# Patient Record
Sex: MALE | Race: AMERICAN INDIAN OR ALASKA NATIVE
[De-identification: names, ages, dates, MRNs, and addresses within clinical notes are randomized per-mention and may not be internally consistent; named-entity substitution may affect disease eponyms.]

---

## 2022-10-11 ENCOUNTER — HOSPITAL ENCOUNTER (INPATIENT)
Dept: HOSPITAL 95 - ER | Age: 62
LOS: 3 days | Discharge: HOME | DRG: 871 | End: 2022-10-14
Attending: STUDENT IN AN ORGANIZED HEALTH CARE EDUCATION/TRAINING PROGRAM | Admitting: HOSPITALIST
Payer: COMMERCIAL

## 2022-10-11 VITALS — BODY MASS INDEX: 14.79 KG/M2 | WEIGHT: 86.64 LBS | HEIGHT: 64 IN

## 2022-10-11 DIAGNOSIS — Z98.890: ICD-10-CM

## 2022-10-11 DIAGNOSIS — Z85.47: ICD-10-CM

## 2022-10-11 DIAGNOSIS — Z98.52: ICD-10-CM

## 2022-10-11 DIAGNOSIS — J96.21: ICD-10-CM

## 2022-10-11 DIAGNOSIS — E87.20: ICD-10-CM

## 2022-10-11 DIAGNOSIS — A41.9: Primary | ICD-10-CM

## 2022-10-11 DIAGNOSIS — E43: ICD-10-CM

## 2022-10-11 DIAGNOSIS — R65.20: ICD-10-CM

## 2022-10-11 DIAGNOSIS — J44.1: ICD-10-CM

## 2022-10-11 DIAGNOSIS — R64: ICD-10-CM

## 2022-10-11 DIAGNOSIS — Z91.018: ICD-10-CM

## 2022-10-11 DIAGNOSIS — F17.210: ICD-10-CM

## 2022-10-11 DIAGNOSIS — R73.03: ICD-10-CM

## 2022-10-11 DIAGNOSIS — Z79.51: ICD-10-CM

## 2022-10-11 LAB
ALBUMIN SERPL BCP-MCNC: 3.2 G/DL (ref 3.4–5)
ALBUMIN/GLOB SERPL: 0.7 {RATIO} (ref 0.8–1.8)
ALT SERPL W P-5'-P-CCNC: 36 U/L (ref 12–78)
ANION GAP SERPL CALCULATED.4IONS-SCNC: 4 MMOL/L (ref 6–16)
AST SERPL W P-5'-P-CCNC: 22 U/L (ref 12–37)
BASOPHILS # BLD AUTO: 0.06 K/MM3 (ref 0–0.23)
BASOPHILS NFR BLD AUTO: 1 % (ref 0–2)
BILIRUB SERPL-MCNC: 0.2 MG/DL (ref 0.1–1)
BUN SERPL-MCNC: 17 MG/DL (ref 8–24)
CALCIUM SERPL-MCNC: 9.5 MG/DL (ref 8.5–10.1)
CHLORIDE SERPL-SCNC: 103 MMOL/L (ref 98–108)
CO2 SERPL-SCNC: 32 MMOL/L (ref 21–32)
CREAT SERPL-MCNC: 0.73 MG/DL (ref 0.6–1.2)
DEPRECATED RDW RBC AUTO: 42.7 FL (ref 35.1–46.3)
EOSINOPHIL # BLD AUTO: 0.09 K/MM3 (ref 0–0.68)
EOSINOPHIL NFR BLD AUTO: 1 % (ref 0–6)
ERYTHROCYTE [DISTWIDTH] IN BLOOD BY AUTOMATED COUNT: 11.9 % (ref 11.7–14.2)
GLOBULIN SER CALC-MCNC: 4.7 G/DL (ref 2.2–4)
GLUCOSE SERPL-MCNC: 126 MG/DL (ref 70–99)
HCT VFR BLD AUTO: 47.6 % (ref 37–53)
HGB BLD-MCNC: 15.5 G/DL (ref 13.5–17.5)
IMM GRANULOCYTES # BLD AUTO: 0.2 K/MM3 (ref 0–0.1)
IMM GRANULOCYTES NFR BLD AUTO: 2 % (ref 0–1)
LYMPHOCYTES # BLD AUTO: 1.32 K/MM3 (ref 0.84–5.2)
LYMPHOCYTES NFR BLD AUTO: 11 % (ref 21–46)
MCHC RBC AUTO-ENTMCNC: 32.6 G/DL (ref 31.5–36.5)
MCV RBC AUTO: 97 FL (ref 80–100)
MONOCYTES # BLD AUTO: 1.29 K/MM3 (ref 0.16–1.47)
MONOCYTES NFR BLD AUTO: 10 % (ref 4–13)
NEUTROPHILS # BLD AUTO: 9.48 K/MM3 (ref 1.96–9.15)
NEUTROPHILS NFR BLD AUTO: 76 % (ref 41–73)
NRBC # BLD AUTO: 0 K/MM3 (ref 0–0.02)
NRBC BLD AUTO-RTO: 0 /100 WBC (ref 0–0.2)
PLATELET # BLD AUTO: 445 K/MM3 (ref 150–400)
POTASSIUM SERPL-SCNC: 4.4 MMOL/L (ref 3.5–5.5)
PROT SERPL-MCNC: 7.9 G/DL (ref 6.4–8.2)
SODIUM SERPL-SCNC: 139 MMOL/L (ref 136–145)

## 2022-10-11 PROCEDURE — A9270 NON-COVERED ITEM OR SERVICE: HCPCS

## 2022-10-12 LAB
ANION GAP SERPL CALCULATED.4IONS-SCNC: 4 MMOL/L (ref 6–16)
BASOPHILS # BLD AUTO: 0.04 K/MM3 (ref 0–0.23)
BASOPHILS NFR BLD AUTO: 0 % (ref 0–2)
BUN SERPL-MCNC: 22 MG/DL (ref 8–24)
CALCIUM SERPL-MCNC: 9.2 MG/DL (ref 8.5–10.1)
CHLORIDE SERPL-SCNC: 103 MMOL/L (ref 98–108)
CO2 SERPL-SCNC: 35 MMOL/L (ref 21–32)
CREAT SERPL-MCNC: 0.78 MG/DL (ref 0.6–1.2)
DEPRECATED RDW RBC AUTO: 44.2 FL (ref 35.1–46.3)
EOSINOPHIL # BLD AUTO: 0 K/MM3 (ref 0–0.68)
EOSINOPHIL NFR BLD AUTO: 0 % (ref 0–6)
ERYTHROCYTE [DISTWIDTH] IN BLOOD BY AUTOMATED COUNT: 11.9 % (ref 11.7–14.2)
GLUCOSE SERPL-MCNC: 176 MG/DL (ref 70–99)
HCT VFR BLD AUTO: 48 % (ref 37–53)
HGB BLD-MCNC: 15.1 G/DL (ref 13.5–17.5)
IMM GRANULOCYTES # BLD AUTO: 0.18 K/MM3 (ref 0–0.1)
IMM GRANULOCYTES NFR BLD AUTO: 2 % (ref 0–1)
LYMPHOCYTES # BLD AUTO: 0.51 K/MM3 (ref 0.84–5.2)
LYMPHOCYTES NFR BLD AUTO: 5 % (ref 21–46)
MCHC RBC AUTO-ENTMCNC: 31.5 G/DL (ref 31.5–36.5)
MCV RBC AUTO: 100 FL (ref 80–100)
MONOCYTES # BLD AUTO: 0.18 K/MM3 (ref 0.16–1.47)
MONOCYTES NFR BLD AUTO: 2 % (ref 4–13)
NEUTROPHILS # BLD AUTO: 8.61 K/MM3 (ref 1.96–9.15)
NEUTROPHILS NFR BLD AUTO: 90 % (ref 41–73)
NRBC # BLD AUTO: 0 K/MM3 (ref 0–0.02)
NRBC BLD AUTO-RTO: 0 /100 WBC (ref 0–0.2)
PLATELET # BLD AUTO: 372 K/MM3 (ref 150–400)
POTASSIUM SERPL-SCNC: 5.8 MMOL/L (ref 3.5–5.5)
SODIUM SERPL-SCNC: 142 MMOL/L (ref 136–145)

## 2022-10-12 NOTE — NUR
SUMMARY
 
PT SITTING UP AT THE EDGE OF THE BED EATING DINNER, PT HAS BEEN PLEASANT AND
COOPERATIVE T/O THE DAY, USES THE CALL LIGHT APPROPRIATELY, INDEPENDENT IN THE
ROOM, PT MED PER EMAR FOR C/O NAUSEA DURING ANTIBIOTIC ADMINISTRATION, WITH
GOOD RESULTS, VSS, WILL CONT TO MONITOR

## 2022-10-13 LAB
ANION GAP SERPL CALCULATED.4IONS-SCNC: 3 MMOL/L (ref 6–16)
BASOPHILS # BLD AUTO: 0.06 K/MM3 (ref 0–0.23)
BASOPHILS NFR BLD AUTO: 0 % (ref 0–2)
BUN SERPL-MCNC: 20 MG/DL (ref 8–24)
CALCIUM SERPL-MCNC: 9.3 MG/DL (ref 8.5–10.1)
CHLORIDE SERPL-SCNC: 99 MMOL/L (ref 98–108)
CO2 SERPL-SCNC: 38 MMOL/L (ref 21–32)
CREAT SERPL-MCNC: 0.62 MG/DL (ref 0.6–1.2)
DEPRECATED RDW RBC AUTO: 43.8 FL (ref 35.1–46.3)
EOSINOPHIL # BLD AUTO: 0 K/MM3 (ref 0–0.68)
EOSINOPHIL NFR BLD AUTO: 0 % (ref 0–6)
ERYTHROCYTE [DISTWIDTH] IN BLOOD BY AUTOMATED COUNT: 11.9 % (ref 11.7–14.2)
GLUCOSE SERPL-MCNC: 144 MG/DL (ref 70–99)
HCT VFR BLD AUTO: 44.6 % (ref 37–53)
HGB BLD-MCNC: 14.5 G/DL (ref 13.5–17.5)
IMM GRANULOCYTES # BLD AUTO: 0.38 K/MM3 (ref 0–0.1)
IMM GRANULOCYTES NFR BLD AUTO: 2 % (ref 0–1)
LYMPHOCYTES # BLD AUTO: 0.6 K/MM3 (ref 0.84–5.2)
LYMPHOCYTES NFR BLD AUTO: 3 % (ref 21–46)
MAGNESIUM SERPL-MCNC: 2.3 MG/DL (ref 1.6–2.4)
MCHC RBC AUTO-ENTMCNC: 32.5 G/DL (ref 31.5–36.5)
MCV RBC AUTO: 99 FL (ref 80–100)
MONOCYTES # BLD AUTO: 0.76 K/MM3 (ref 0.16–1.47)
MONOCYTES NFR BLD AUTO: 4 % (ref 4–13)
NEUTROPHILS # BLD AUTO: 15.6 K/MM3 (ref 1.96–9.15)
NEUTROPHILS NFR BLD AUTO: 90 % (ref 41–73)
NRBC # BLD AUTO: 0 K/MM3 (ref 0–0.02)
NRBC BLD AUTO-RTO: 0 /100 WBC (ref 0–0.2)
PLATELET # BLD AUTO: 371 K/MM3 (ref 150–400)
POTASSIUM SERPL-SCNC: 5 MMOL/L (ref 3.5–5.5)
SODIUM SERPL-SCNC: 140 MMOL/L (ref 136–145)

## 2022-10-13 NOTE — NUR
SUMMARY
 
PT SITTING UP AT THE BEDSIDE EATING DINNER, PT HAS BEEN PLEASANT AND
COOPERATIVE T/O THE DAY, INDEP IN THE ROOM, REMAINS ON 2L NC, HOME O2 EVAL
DONE, CARE MANAGEMENT TO ARRANGE HOME OXYGEN, PT DENIES PAIN OR NAUSEA,
HOPEFUL TO DC HOME TOMORROW, VSS, WILL CONT TO MONITOR

## 2022-10-13 NOTE — NUR
NIGHT SHIFT SUMMARY:
A&Ox4. PLEASANT AND COOPERATIVE WITH CARE. VSS. INDEPENDENT WITHIN ROOM. NO
C/O PAIN OR DISCOMFORT T/O THE NIGHT. NO ACUTE CONCERNS. WILL REPORT TO
ONCOMING RN.

## 2022-10-14 LAB
ANION GAP SERPL CALCULATED.4IONS-SCNC: 3 MMOL/L (ref 6–16)
BASOPHILS # BLD AUTO: 0.06 K/MM3 (ref 0–0.23)
BASOPHILS NFR BLD AUTO: 0 % (ref 0–2)
BUN SERPL-MCNC: 29 MG/DL (ref 8–24)
CALCIUM SERPL-MCNC: 9.2 MG/DL (ref 8.5–10.1)
CHLORIDE SERPL-SCNC: 98 MMOL/L (ref 98–108)
CO2 SERPL-SCNC: 39 MMOL/L (ref 21–32)
CREAT SERPL-MCNC: 0.62 MG/DL (ref 0.6–1.2)
DEPRECATED RDW RBC AUTO: 44.5 FL (ref 35.1–46.3)
EOSINOPHIL # BLD AUTO: 0 K/MM3 (ref 0–0.68)
EOSINOPHIL NFR BLD AUTO: 0 % (ref 0–6)
ERYTHROCYTE [DISTWIDTH] IN BLOOD BY AUTOMATED COUNT: 11.9 % (ref 11.7–14.2)
GLUCOSE SERPL-MCNC: 233 MG/DL (ref 70–99)
HCT VFR BLD AUTO: 46.6 % (ref 37–53)
HGB BLD-MCNC: 14.7 G/DL (ref 13.5–17.5)
IMM GRANULOCYTES # BLD AUTO: 0.65 K/MM3 (ref 0–0.1)
IMM GRANULOCYTES NFR BLD AUTO: 4 % (ref 0–1)
LYMPHOCYTES # BLD AUTO: 0.41 K/MM3 (ref 0.84–5.2)
LYMPHOCYTES NFR BLD AUTO: 3 % (ref 21–46)
MCHC RBC AUTO-ENTMCNC: 31.5 G/DL (ref 31.5–36.5)
MCV RBC AUTO: 100 FL (ref 80–100)
MONOCYTES # BLD AUTO: 0.74 K/MM3 (ref 0.16–1.47)
MONOCYTES NFR BLD AUTO: 5 % (ref 4–13)
NEUTROPHILS # BLD AUTO: 13.91 K/MM3 (ref 1.96–9.15)
NEUTROPHILS NFR BLD AUTO: 88 % (ref 41–73)
NRBC # BLD AUTO: 0 K/MM3 (ref 0–0.02)
NRBC BLD AUTO-RTO: 0 /100 WBC (ref 0–0.2)
PLATELET # BLD AUTO: 390 K/MM3 (ref 150–400)
POTASSIUM SERPL-SCNC: 4.9 MMOL/L (ref 3.5–5.5)
SODIUM SERPL-SCNC: 140 MMOL/L (ref 136–145)

## 2022-10-14 NOTE — NUR
Summary: Patient assisted with shower before bed. Educated him to leave
his NC in place while showering. Patient Aox4 throughout shift. No complaints
of pain. VSS. No acute events overnight. Plan for DC home today after home O2
arrives.

## 2022-10-14 NOTE — NUR
SUMMARY/DISCHARGE
 
PT DISCHARGED TO HOME, PT VERBALIZED UNDERSTANDING OF DISCHARGE INSTRUCTIONS
REGARDING MEDS AND FOLLOW UP, PT'S HOME OXYGEN HAS BEEN DELIVERED, PT TAKEN
OUT VIA WHEELCHAIR

## 2022-12-14 ENCOUNTER — HOSPITAL ENCOUNTER (OUTPATIENT)
Dept: HOSPITAL 95 - ORSCMMR | Age: 62
Discharge: HOME | End: 2022-12-14
Attending: STUDENT IN AN ORGANIZED HEALTH CARE EDUCATION/TRAINING PROGRAM
Payer: COMMERCIAL

## 2022-12-14 VITALS — WEIGHT: 97.44 LBS | BODY MASS INDEX: 17.27 KG/M2 | HEIGHT: 63 IN

## 2022-12-14 DIAGNOSIS — E11.9: ICD-10-CM

## 2022-12-14 DIAGNOSIS — Z79.899: ICD-10-CM

## 2022-12-14 DIAGNOSIS — K40.90: Primary | ICD-10-CM

## 2022-12-14 DIAGNOSIS — J44.9: ICD-10-CM

## 2022-12-14 DIAGNOSIS — Z87.891: ICD-10-CM

## 2022-12-14 PROCEDURE — 0YU60JZ SUPPLEMENT LEFT INGUINAL REGION WITH SYNTHETIC SUBSTITUTE, OPEN APPROACH: ICD-10-PCS | Performed by: STUDENT IN AN ORGANIZED HEALTH CARE EDUCATION/TRAINING PROGRAM

## 2022-12-14 PROCEDURE — A9270 NON-COVERED ITEM OR SERVICE: HCPCS

## 2022-12-14 PROCEDURE — C1781 MESH (IMPLANTABLE): HCPCS

## 2022-12-14 NOTE — NUR
Ambulatory in Day Surgery. Pre-Op teaching done. Pt verbalizes understanding.
Patient confirms NPO status and agrees with scheduled surgery.
Patient States Post-Procedure ride home has been arranged.
Patient reports completing Chlorhexadine shower X2 prior to admission to
hospital. History, Chart, Medications and Allergies reviewed before start of
procedure.

## 2022-12-14 NOTE — NUR
Patient up to Ambulate independently. Gait steady.
Discharged via wheelchair to private car for ride home.
Discharge instructions reviewed with patient. Patient verbalizes understanding.
Copy given to patient to take home.

## 2023-03-28 ENCOUNTER — HOSPITAL ENCOUNTER (OUTPATIENT)
Dept: HOSPITAL 95 - ORSCMMR | Age: 63
Discharge: HOME | End: 2023-03-28
Attending: INTERNAL MEDICINE
Payer: COMMERCIAL

## 2023-03-28 VITALS — BODY MASS INDEX: 18.24 KG/M2 | WEIGHT: 102.96 LBS | HEIGHT: 63 IN

## 2023-03-28 DIAGNOSIS — J44.9: ICD-10-CM

## 2023-03-28 DIAGNOSIS — Z85.47: ICD-10-CM

## 2023-03-28 DIAGNOSIS — D12.0: ICD-10-CM

## 2023-03-28 DIAGNOSIS — Z12.11: Primary | ICD-10-CM

## 2023-03-28 DIAGNOSIS — Z86.010: ICD-10-CM

## 2023-03-28 DIAGNOSIS — D12.5: ICD-10-CM

## 2023-03-28 DIAGNOSIS — Z87.891: ICD-10-CM

## 2023-03-28 DIAGNOSIS — Z79.899: ICD-10-CM

## 2023-03-28 PROCEDURE — 0DBH8ZX EXCISION OF CECUM, VIA NATURAL OR ARTIFICIAL OPENING ENDOSCOPIC, DIAGNOSTIC: ICD-10-PCS | Performed by: INTERNAL MEDICINE

## 2023-03-28 PROCEDURE — 0DBN8ZX EXCISION OF SIGMOID COLON, VIA NATURAL OR ARTIFICIAL OPENING ENDOSCOPIC, DIAGNOSTIC: ICD-10-PCS | Performed by: INTERNAL MEDICINE

## 2023-03-28 NOTE — NUR
03/28/23 0757 Gerri Gill
HISTORY, CHART, MEDICATIONS AND ALLERGIES REVIEWED BEFORE START OF
PROCEDURE. PATIENT CONFIRMS NPO STATUS AND AGREES WITH SCHEDULED
PROCEDURE. 3-LEAD EKG REVIEWED WITH PHYSICIAN PRIOR TO START OF
PROCEDURE. MONITOR INTACT WITH CONTINUOUS PULSE OXIMETRY,CAPNOGRAPHY,
3-LEAD EKG, INTERMITTENT BP. SUPPLEMENTAL O2 TO BE TITRATED THROUGHOUT
PROCEDURE TO MAINTAIN O2 SATURATION ABOVE 90%. PATIENT DETERMINED TO
BE ASA APPROPRIATE FOR PROPOFOL SEDATION PRIOR TO START OF PROCEDURE
BY DR. AREVALO

## 2023-03-28 NOTE — NUR
Discharge instructions reviewed with patient. Patient verbalizes
understanding.  Copy given to patient to take home.  Discharged via wheelchair
to private car for ride home.